# Patient Record
Sex: MALE | URBAN - METROPOLITAN AREA
[De-identification: names, ages, dates, MRNs, and addresses within clinical notes are randomized per-mention and may not be internally consistent; named-entity substitution may affect disease eponyms.]

---

## 2020-12-04 ENCOUNTER — OFFICE VISIT (OUTPATIENT)
Dept: URBAN - METROPOLITAN AREA CLINIC 27 | Facility: CLINIC | Age: 65
End: 2020-12-04
Payer: MEDICARE

## 2020-12-04 DIAGNOSIS — H04.123 DRY EYE SYNDROME OF BILATERAL LACRIMAL GLANDS: ICD-10-CM

## 2020-12-04 DIAGNOSIS — H43.813 BILATERAL VITREOUS DETACHMENT OF EYES: ICD-10-CM

## 2020-12-04 DIAGNOSIS — H44.23 DEGENERATIVE MYOPIA, BILATERAL: ICD-10-CM

## 2020-12-04 DIAGNOSIS — H25.13 AGE-RELATED NUCLEAR CATARACT, BILATERAL: ICD-10-CM

## 2020-12-04 DIAGNOSIS — H35.372 PUCKERING OF MACULA, LEFT EYE: Primary | ICD-10-CM

## 2020-12-04 PROCEDURE — 99214 OFFICE O/P EST MOD 30 MIN: CPT | Performed by: OPHTHALMOLOGY

## 2020-12-04 PROCEDURE — 92134 CPTRZ OPH DX IMG PST SGM RTA: CPT | Performed by: OPHTHALMOLOGY

## 2020-12-04 ASSESSMENT — INTRAOCULAR PRESSURE
OD: 13
OS: 13

## 2020-12-04 NOTE — IMPRESSION/PLAN
Impression: Degenerative myopia, OU. Status: Symptomatic. Plan: Exam and OCT reveal no evidence of CNV OU. An IVFA from 06/12/2020 demonstrated no leakage. Fundus photos from 06/12/2020 demonstrated no IRH. Discussed increased risk for developing CNV and retinal tear/retinal detachment secondary to myopia. Advised patient to contact us immediately for any decrease in vision, new metamorphopsia, new floaters/flashing lights or a shadow in the vision. Observe. Thanks, Lindsay Rumble Return in 6 months for follow up, OCT OU, IVFA OS 1st

## 2020-12-04 NOTE — IMPRESSION/PLAN
Impression: h/o ERM, OS. S/P 25g,PPV,MP,ILM,ICG,PRP OS 08/31/2020 (0 [facility]). Plan: The patient has done well with surgery. There is some residual macular thickening. Follow.